# Patient Record
Sex: FEMALE | Race: WHITE | NOT HISPANIC OR LATINO
[De-identification: names, ages, dates, MRNs, and addresses within clinical notes are randomized per-mention and may not be internally consistent; named-entity substitution may affect disease eponyms.]

---

## 2018-04-02 ENCOUNTER — RESULT REVIEW (OUTPATIENT)
Age: 78
End: 2018-04-02

## 2018-10-10 ENCOUNTER — RESULT REVIEW (OUTPATIENT)
Age: 78
End: 2018-10-10

## 2018-10-29 PROBLEM — Z00.00 ENCOUNTER FOR PREVENTIVE HEALTH EXAMINATION: Status: ACTIVE | Noted: 2018-10-29

## 2018-12-11 ENCOUNTER — RECORD ABSTRACTING (OUTPATIENT)
Age: 78
End: 2018-12-11

## 2018-12-11 DIAGNOSIS — Z83.3 FAMILY HISTORY OF DIABETES MELLITUS: ICD-10-CM

## 2018-12-11 DIAGNOSIS — Z87.891 PERSONAL HISTORY OF NICOTINE DEPENDENCE: ICD-10-CM

## 2018-12-11 DIAGNOSIS — Z82.49 FAMILY HISTORY OF ISCHEMIC HEART DISEASE AND OTHER DISEASES OF THE CIRCULATORY SYSTEM: ICD-10-CM

## 2018-12-11 DIAGNOSIS — M41.26 OTHER IDIOPATHIC SCOLIOSIS, LUMBAR REGION: ICD-10-CM

## 2018-12-11 RX ORDER — DIAPER,BRIEF,INFANT-TODD,DISP
EACH MISCELLANEOUS
Refills: 0 | Status: ACTIVE | COMMUNITY

## 2018-12-17 ENCOUNTER — APPOINTMENT (OUTPATIENT)
Dept: RHEUMATOLOGY | Facility: CLINIC | Age: 78
End: 2018-12-17

## 2019-01-11 ENCOUNTER — RX RENEWAL (OUTPATIENT)
Age: 79
End: 2019-01-11

## 2019-02-06 ENCOUNTER — RX RENEWAL (OUTPATIENT)
Age: 79
End: 2019-02-06

## 2019-03-01 ENCOUNTER — APPOINTMENT (OUTPATIENT)
Dept: RHEUMATOLOGY | Facility: CLINIC | Age: 79
End: 2019-03-01
Payer: MEDICARE

## 2019-03-01 VITALS
OXYGEN SATURATION: 96 % | DIASTOLIC BLOOD PRESSURE: 58 MMHG | BODY MASS INDEX: 25.3 KG/M2 | WEIGHT: 141 LBS | HEART RATE: 69 BPM | HEIGHT: 62.5 IN | SYSTOLIC BLOOD PRESSURE: 124 MMHG

## 2019-03-01 DIAGNOSIS — M17.0 BILATERAL PRIMARY OSTEOARTHRITIS OF KNEE: ICD-10-CM

## 2019-03-01 PROCEDURE — 99213 OFFICE O/P EST LOW 20 MIN: CPT

## 2019-03-01 RX ORDER — GLUCOSAMINE HCL 500 MG
500 TABLET ORAL
Refills: 0 | Status: DISCONTINUED | COMMUNITY
End: 2019-03-01

## 2019-03-01 NOTE — HISTORY OF PRESENT ILLNESS
[FreeTextEntry1] : Patient reports that Medrol Dose Pack, then continued 2 mg daily, Rx'ed in October, was very effective.  Taking medications as Rx'ed.

## 2019-03-01 NOTE — PHYSICAL EXAM
[General Appearance - Alert] : alert [General Appearance - In No Acute Distress] : in no acute distress [General Appearance - Well Nourished] : well nourished [General Appearance - Well Developed] : well developed [Sclera] : the sclera and conjunctiva were normal [] : no rash [FreeTextEntry1] : There are prominent Heberden's and Boudhanrd's nodes with deformities and without active, acute synovitis.

## 2019-03-01 NOTE — REVIEW OF SYSTEMS
[Abdominal Pain] : abdominal pain [Diarrhea] : diarrhea [Joint Pain] : joint pain [Joint Swelling] : joint swelling [Joint Stiffness] : joint stiffness [Fever] : no fever [Chills] : no chills [Eye Pain] : no eye pain [Red Eyes] : eyes not red [Shortness Of Breath] : no shortness of breath [Cough] : no cough [Skin Lesions] : no skin lesions

## 2019-04-03 ENCOUNTER — APPOINTMENT (OUTPATIENT)
Dept: GASTROENTEROLOGY | Facility: CLINIC | Age: 79
End: 2019-04-03
Payer: MEDICARE

## 2019-04-03 VITALS
WEIGHT: 140 LBS | BODY MASS INDEX: 25.12 KG/M2 | DIASTOLIC BLOOD PRESSURE: 76 MMHG | HEIGHT: 62.5 IN | SYSTOLIC BLOOD PRESSURE: 130 MMHG | HEART RATE: 72 BPM

## 2019-04-03 DIAGNOSIS — Z80.0 FAMILY HISTORY OF MALIGNANT NEOPLASM OF DIGESTIVE ORGANS: ICD-10-CM

## 2019-04-03 PROCEDURE — 99203 OFFICE O/P NEW LOW 30 MIN: CPT

## 2019-04-03 RX ORDER — RIFAXIMIN 550 MG/1
550 TABLET ORAL
Qty: 42 | Refills: 0 | Status: DISCONTINUED | COMMUNITY
Start: 2018-11-02 | End: 2019-04-03

## 2019-04-03 NOTE — PHYSICAL EXAM
[General Appearance - Alert] : alert [General Appearance - In No Acute Distress] : in no acute distress [Sclera] : the sclera and conjunctiva were normal [Outer Ear] : the ears and nose were normal in appearance [Neck Appearance] : the appearance of the neck was normal [] : no respiratory distress [Abdomen Soft] : soft [FreeTextEntry1] : deferred [Skin Color & Pigmentation] : normal skin color and pigmentation [No Focal Deficits] : no focal deficits [Oriented To Time, Place, And Person] : oriented to person, place, and time

## 2019-04-03 NOTE — CONSULT LETTER
[Dear  ___] : Dear  [unfilled], [Consult Letter:] : I had the pleasure of evaluating your patient, [unfilled]. [Please see my note below.] : Please see my note below. [Consult Closing:] : Thank you very much for allowing me to participate in the care of this patient.  If you have any questions, please do not hesitate to contact me. [Sincerely,] : Sincerely, [FreeTextEntry3] : Jefferson Lacey MD\par tel: [phone]\par fax: [phone]\par

## 2019-04-03 NOTE — ASSESSMENT
[FreeTextEntry1] : 1. Family  history of colon cancer:   Colonoscopy in 3/2020\par \par 2. IBS:  Food / stool diarrhea

## 2019-04-03 NOTE — HISTORY OF PRESENT ILLNESS
[de-identified] : [facility]  is being evaluated at the request of Dr. Nelson / Adrian for an opinion re: family history of colon cancer and IBS. Patient has a family h/o colon cancer in both a younger brother and sister. Has been  followed by Dr. Pitts. Last EGD  / colonoscopy was 3/2018  revealing  diverticulosis. One random colon biopsies for ? IBS -D was normal. Treated in past with Xifaxan for ? SIBO. Colon in 2013 / 2016  ( [facility]) unremarkable. EGD in 2015 was normal. Denies nausea, vomiting, fever, chills, reflux, BRBPR. Has frequent intermittent diarrhea in response to certain foods\par

## 2019-05-31 ENCOUNTER — RX RENEWAL (OUTPATIENT)
Age: 79
End: 2019-05-31

## 2019-06-11 ENCOUNTER — RX RENEWAL (OUTPATIENT)
Age: 79
End: 2019-06-11

## 2019-08-05 ENCOUNTER — RX RENEWAL (OUTPATIENT)
Age: 79
End: 2019-08-05

## 2019-08-07 ENCOUNTER — RX RENEWAL (OUTPATIENT)
Age: 79
End: 2019-08-07

## 2019-10-16 ENCOUNTER — RESULT REVIEW (OUTPATIENT)
Age: 79
End: 2019-10-16

## 2019-11-06 ENCOUNTER — APPOINTMENT (OUTPATIENT)
Dept: GASTROENTEROLOGY | Facility: CLINIC | Age: 79
End: 2019-11-06
Payer: MEDICARE

## 2019-11-06 VITALS
DIASTOLIC BLOOD PRESSURE: 66 MMHG | WEIGHT: 143 LBS | BODY MASS INDEX: 25.66 KG/M2 | HEART RATE: 70 BPM | HEIGHT: 62.5 IN | SYSTOLIC BLOOD PRESSURE: 120 MMHG

## 2019-11-06 PROCEDURE — 36415 COLL VENOUS BLD VENIPUNCTURE: CPT

## 2019-11-06 PROCEDURE — 99214 OFFICE O/P EST MOD 30 MIN: CPT | Mod: 25

## 2019-11-06 NOTE — HISTORY OF PRESENT ILLNESS
[de-identified] : Presents for f/u with a c/o diarrhea. C/O diarrhea most mornings. Admits to 2 exposive diarrheal episodes in the past 2 years ( not able to make it to bathroom for one of those episodes). Did not keep a food diarrhea. Labs from Dr. Campbell ( 8/2019)  were reviewed and were notable for  normal TSH/ LFTS/ HGB/ elevated MCV.  Patient has a family h/o colon cancer in both a younger brother and sister. Has been  followed by Dr. Pitts. Last EGD  / colonoscopy was 3/2018  revealing  diverticulosis. One random colon biopsies for ? IBS -D was normal. Treated in past with Xifaxan for ? SIBO. Colon in 2013 / 2016  ( [facility]) unremarkable. EGD in 2015 was normal. Denies nausea, vomiting, fever, chills, reflux, BRBPR. Has frequent intermittent diarrhea in response to certain foods\par

## 2019-11-06 NOTE — ASSESSMENT
[FreeTextEntry1] : 1. Family  history of colon cancer:   Colonoscopy in 3/2020\par \par 2. IBS:  Food / stool diary. Stool studies/labs ...if negative...trial Xifaxan

## 2019-11-06 NOTE — PHYSICAL EXAM
[General Appearance - Alert] : alert [General Appearance - In No Acute Distress] : in no acute distress [Sclera] : the sclera and conjunctiva were normal [Outer Ear] : the ears and nose were normal in appearance [Neck Appearance] : the appearance of the neck was normal [] : no respiratory distress [Abdomen Soft] : soft [Skin Color & Pigmentation] : normal skin color and pigmentation [No Focal Deficits] : no focal deficits [Oriented To Time, Place, And Person] : oriented to person, place, and time [FreeTextEntry1] : deferred

## 2019-11-06 NOTE — CONSULT LETTER
[Dear  ___] : Dear  [unfilled], [Consult Letter:] : I had the pleasure of evaluating your patient, [unfilled]. [Please see my note below.] : Please see my note below. [Consult Closing:] : Thank you very much for allowing me to participate in the care of this patient.  If you have any questions, please do not hesitate to contact me. [Sincerely,] : Sincerely, [FreeTextEntry3] : Jefferson Lacey MD\par tel: [phone]\par fax: [phone]\par

## 2019-11-07 LAB
GLIADIN IGA SER QL: <5 UNITS
GLIADIN IGG SER QL: <5 UNITS
GLIADIN PEPTIDE IGA SER-ACNC: NEGATIVE
GLIADIN PEPTIDE IGG SER-ACNC: NEGATIVE
IGA SER QL IEP: 197 MG/DL
TTG IGA SER IA-ACNC: <1.2 U/ML
TTG IGA SER-ACNC: NEGATIVE
TTG IGG SER IA-ACNC: <1.2 U/ML
TTG IGG SER IA-ACNC: NEGATIVE

## 2019-11-11 LAB
BACTERIA STL CULT: NORMAL
C DIFF TOX GENS STL QL NAA+PROBE: NORMAL
CDIFF BY PCR: NOT DETECTED

## 2019-11-12 LAB — CALPROTECTIN FECAL: <16 UG/G

## 2019-11-13 LAB
LACTOFERRIN STL-MCNC: <1
PANCREATIC ELASTASE, FECAL: 201

## 2019-11-18 ENCOUNTER — RX CHANGE (OUTPATIENT)
Age: 79
End: 2019-11-18

## 2019-11-20 LAB — DEPRECATED O AND P PREP STL: NORMAL

## 2019-11-25 ENCOUNTER — RESULT REVIEW (OUTPATIENT)
Age: 79
End: 2019-11-25

## 2019-11-26 ENCOUNTER — APPOINTMENT (OUTPATIENT)
Dept: GASTROENTEROLOGY | Facility: HOSPITAL | Age: 79
End: 2019-11-26

## 2019-12-18 ENCOUNTER — RX RENEWAL (OUTPATIENT)
Age: 79
End: 2019-12-18

## 2020-02-06 ENCOUNTER — RX RENEWAL (OUTPATIENT)
Age: 80
End: 2020-02-06

## 2020-03-06 ENCOUNTER — APPOINTMENT (OUTPATIENT)
Dept: RHEUMATOLOGY | Facility: CLINIC | Age: 80
End: 2020-03-06
Payer: MEDICARE

## 2020-03-06 VITALS
HEIGHT: 62.5 IN | DIASTOLIC BLOOD PRESSURE: 70 MMHG | BODY MASS INDEX: 26.19 KG/M2 | SYSTOLIC BLOOD PRESSURE: 100 MMHG | WEIGHT: 146 LBS

## 2020-03-06 PROCEDURE — 99214 OFFICE O/P EST MOD 30 MIN: CPT

## 2020-03-06 NOTE — HISTORY OF PRESENT ILLNESS
[FreeTextEntry1] : Patient is doing well on her current regimen of medications - she is still on low dose Medrol to control symptoms of lumbar SS, and she is taking daily quinine sulfate.  There have been no SEs from the current treatment regimen.

## 2020-03-06 NOTE — PHYSICAL EXAM
[General Appearance - Alert] : alert [General Appearance - In No Acute Distress] : in no acute distress [General Appearance - Well Nourished] : well nourished [General Appearance - Well Developed] : well developed [Sclera] : the sclera and conjunctiva were normal [Heart Rate And Rhythm] : heart rate was normal and rhythm regular [Heart Sounds] : normal S1 and S2 [] : no rash [FreeTextEntry1] : There are prominent Heberden's and Boudhanrd's nodes with deformities and without active, acute synovitis.

## 2020-05-31 ENCOUNTER — RESULT REVIEW (OUTPATIENT)
Age: 80
End: 2020-05-31

## 2020-06-01 ENCOUNTER — RESULT REVIEW (OUTPATIENT)
Age: 80
End: 2020-06-01

## 2020-06-02 ENCOUNTER — APPOINTMENT (OUTPATIENT)
Dept: GASTROENTEROLOGY | Facility: HOSPITAL | Age: 80
End: 2020-06-02

## 2020-06-16 ENCOUNTER — TRANSCRIPTION ENCOUNTER (OUTPATIENT)
Age: 80
End: 2020-06-16

## 2020-12-07 ENCOUNTER — NON-APPOINTMENT (OUTPATIENT)
Age: 80
End: 2020-12-07

## 2021-01-13 ENCOUNTER — RESULT REVIEW (OUTPATIENT)
Age: 81
End: 2021-01-13

## 2021-02-12 ENCOUNTER — APPOINTMENT (OUTPATIENT)
Dept: RHEUMATOLOGY | Facility: CLINIC | Age: 81
End: 2021-02-12
Payer: MEDICARE

## 2021-02-12 VITALS
SYSTOLIC BLOOD PRESSURE: 130 MMHG | HEIGHT: 62.5 IN | BODY MASS INDEX: 26.02 KG/M2 | WEIGHT: 145 LBS | TEMPERATURE: 98.1 F | DIASTOLIC BLOOD PRESSURE: 66 MMHG

## 2021-02-12 DIAGNOSIS — M79.89 OTHER SPECIFIED SOFT TISSUE DISORDERS: ICD-10-CM

## 2021-02-12 PROCEDURE — 99214 OFFICE O/P EST MOD 30 MIN: CPT

## 2021-02-12 RX ORDER — QUININE SULFATE 324 MG/1
324 CAPSULE ORAL
Qty: 200 | Refills: 0 | Status: DISCONTINUED | OUTPATIENT
End: 2021-02-12

## 2021-02-12 NOTE — HISTORY OF PRESENT ILLNESS
[FreeTextEntry1] : Patient reports that in early January she was seen in ED for palpitations. She is now having cardiac monitoring. Still taking quinine for leg cramps. Also noticed a "lump in the lateral aspect of the left thigh, initially noted about 4 to 5 months ago, and now seems to be getting larger. There is tenderness on palpation, and she was told that it may be a lipoma.

## 2021-02-12 NOTE — PHYSICAL EXAM
[General Appearance - Alert] : alert [General Appearance - In No Acute Distress] : in no acute distress [General Appearance - Well Nourished] : well nourished [General Appearance - Well Developed] : well developed [Sclera] : the sclera and conjunctiva were normal [Heart Rate And Rhythm] : heart rate was normal and rhythm regular [Heart Sounds] : normal S1 and S2 [] : no rash [FreeTextEntry1] : There are prominent Heberden's and Vicente's nodes with deformities and without active, acute synovitis.  There is a small, palpable mass (fullness) over the lateral aspect of the left thigh which is tender measuring about 4 by 5 cm

## 2021-02-18 ENCOUNTER — RESULT REVIEW (OUTPATIENT)
Age: 81
End: 2021-02-18

## 2021-08-09 ENCOUNTER — RX RENEWAL (OUTPATIENT)
Age: 81
End: 2021-08-09

## 2021-11-04 ENCOUNTER — APPOINTMENT (OUTPATIENT)
Dept: GASTROENTEROLOGY | Facility: CLINIC | Age: 81
End: 2021-11-04
Payer: MEDICARE

## 2021-11-04 VITALS
WEIGHT: 143 LBS | BODY MASS INDEX: 25.66 KG/M2 | HEART RATE: 67 BPM | TEMPERATURE: 96.5 F | DIASTOLIC BLOOD PRESSURE: 60 MMHG | SYSTOLIC BLOOD PRESSURE: 110 MMHG | OXYGEN SATURATION: 97 % | HEIGHT: 62.5 IN

## 2021-11-04 DIAGNOSIS — Z80.0 ENCOUNTER FOR SCREENING FOR MALIGNANT NEOPLASM OF COLON: ICD-10-CM

## 2021-11-04 DIAGNOSIS — Z12.11 ENCOUNTER FOR SCREENING FOR MALIGNANT NEOPLASM OF COLON: ICD-10-CM

## 2021-11-04 PROCEDURE — 99213 OFFICE O/P EST LOW 20 MIN: CPT

## 2021-11-04 NOTE — ASSESSMENT
[FreeTextEntry1] : 1. Family  history of colon cancer:   Colonoscopy in 3/2020\par \par 2. IBS / diarrhea:  colonoscopy with random biopsies\par \par 3. GERD:  TUMS for now.  EGD scheduled\par \par Risks of the procedures including but not limited to bleeding / perforation / infection / anesthesia complication / missed  lesions explained to the  patient . The patient expressed understanding and a desire to proceed with the procedures.\par \par Risk of not doing procedures includes but is not limited to missed or delayed diagnosis of gastric pathology, colon cancer or other gastrointestinal pathology\par \par

## 2021-11-04 NOTE — HISTORY OF PRESENT ILLNESS
[de-identified] :  Presents  with recurrance  of  frequent morning diarrhea. Sxs are now  associated with bloating and  reflux. \par Last evaluated in 2019 when presented  for f/u with a c/o diarrhea. C/O diarrhea most mornings. Admitted to 2 explosive diarrheal episodes in the prior  2 years ( not able to make it to bathroom for one of those episodes). Did not keep a food diarrhea. Labs from Dr. Campbell ( 8/2019)  were reviewed and were notable for  normal TSH/ LFTS/ HGB/ elevated MCV. \par \par Patient has a family h/o colon cancer in both a younger brother and sister. Had been  followed by Dr. Pitts. Last EGD  / colonoscopy was 3/2018  revealing  diverticulosis. One random colon biopsies for ? IBS -D was normal. Treated in past with Xifaxan for ? SIBO. Colon in 2013 / 2016  ( [facility]) unremarkable. EGD in 2015 was normal. Denies nausea, vomiting, fever, chills, reflux, BRBPR. Has frequent intermittent diarrhea in response to certain foods\par

## 2021-11-27 ENCOUNTER — RESULT REVIEW (OUTPATIENT)
Age: 81
End: 2021-11-27

## 2021-11-29 ENCOUNTER — RESULT REVIEW (OUTPATIENT)
Age: 81
End: 2021-11-29

## 2021-11-30 ENCOUNTER — APPOINTMENT (OUTPATIENT)
Dept: GASTROENTEROLOGY | Facility: HOSPITAL | Age: 81
End: 2021-11-30

## 2021-12-09 LAB
C DIFF TOX GENS STL QL NAA+PROBE: NORMAL
CDIFF BY PCR: NOT DETECTED
G LAMBLIA AG STL QL: NORMAL

## 2021-12-17 ENCOUNTER — NON-APPOINTMENT (OUTPATIENT)
Age: 81
End: 2021-12-17

## 2021-12-17 LAB
BACTERIA STL CULT: NORMAL
CALPROTECTIN FECAL: 91 UG/G
DEPRECATED O AND P PREP STL: NORMAL
E HISTOLYT AG STL IA-ACNC: NOT DETECTED
LACTOFERRIN STL-MCNC: <1
PANCREATIC ELASTASE, FECAL: 156

## 2022-02-14 ENCOUNTER — RESULT REVIEW (OUTPATIENT)
Age: 82
End: 2022-02-14

## 2022-02-23 ENCOUNTER — RX RENEWAL (OUTPATIENT)
Age: 82
End: 2022-02-23

## 2022-03-09 ENCOUNTER — APPOINTMENT (OUTPATIENT)
Dept: RHEUMATOLOGY | Facility: CLINIC | Age: 82
End: 2022-03-09

## 2022-04-01 ENCOUNTER — APPOINTMENT (OUTPATIENT)
Dept: RHEUMATOLOGY | Facility: CLINIC | Age: 82
End: 2022-04-01
Payer: MEDICARE

## 2022-04-01 VITALS
HEART RATE: 87 BPM | TEMPERATURE: 97.1 F | WEIGHT: 144 LBS | BODY MASS INDEX: 25.84 KG/M2 | DIASTOLIC BLOOD PRESSURE: 58 MMHG | OXYGEN SATURATION: 99 % | HEIGHT: 62.5 IN | SYSTOLIC BLOOD PRESSURE: 110 MMHG

## 2022-04-01 DIAGNOSIS — E78.1 PURE HYPERGLYCERIDEMIA: ICD-10-CM

## 2022-04-01 PROCEDURE — 20610 DRAIN/INJ JOINT/BURSA W/O US: CPT

## 2022-04-01 PROCEDURE — 99214 OFFICE O/P EST MOD 30 MIN: CPT | Mod: 25

## 2022-04-01 RX ORDER — TRIAMCINOLONE ACETONIDE 40 MG/ML
40 SUSPENSION INTRA-ARTERIAL; INTRAMUSCULAR
Qty: 1 | Refills: 0 | Status: COMPLETED | OUTPATIENT
Start: 2022-04-01 | End: 2022-04-02

## 2022-04-01 RX ORDER — TRIAMCINOLONE ACETONIDE 40 MG/ML
40 SUSPENSION INTRA-ARTERIAL; INTRAMUSCULAR
Qty: 1 | Refills: 0 | Status: COMPLETED
Start: 2022-04-01 | End: 2022-04-02

## 2022-04-01 RX ADMIN — TRIAMCINOLONE ACETONIDE 0 MG/ML: 40 INJECTION, SUSPENSION INTRA-ARTICULAR; INTRAMUSCULAR at 00:00

## 2022-04-02 NOTE — PROCEDURE
[FreeTextEntry1] : Right trochanteric bursa area prepped with betadine; 1% lido applied to subQ tissue; 2cc 1% lido and 2cc Kenalog 40 (80 mg) injected into the area of the right trochanteric bursa with good results and now complications.

## 2022-04-02 NOTE — PHYSICAL EXAM
[General Appearance - Alert] : alert [General Appearance - In No Acute Distress] : in no acute distress [General Appearance - Well Nourished] : well nourished [General Appearance - Well Developed] : well developed [Sclera] : the sclera and conjunctiva were normal [] : no rash [Motor Exam] : the motor exam was normal [FreeTextEntry1] : There are prominent Heberden's and Vicente's nodes with deformities and without active, acute synovitis.  There is tenderness over the right trochanteric bursa area - this reproduces patient's CC. There is no pain on ROM of the right hip.

## 2022-04-02 NOTE — HISTORY OF PRESENT ILLNESS
[FreeTextEntry1] : Patient reports that she resumed her quinine after cardiology (electrophysiologist at [facility]) determined that the quinine was not causing her palpitations (A fib).  She is still taking other meds as Rx'ed - no GI symptoms reported except continued diarrhea (for which she has been seen by GI and worked up with upper and lower endoscopies and other testing per patient).  Reports she is having recurrence of right trochanteric bursitis symptoms, and is requesting another steroid injection - last injection was administered over three years ago.

## 2022-04-25 ENCOUNTER — APPOINTMENT (OUTPATIENT)
Dept: GASTROENTEROLOGY | Facility: CLINIC | Age: 82
End: 2022-04-25
Payer: MEDICARE

## 2022-04-25 VITALS
BODY MASS INDEX: 25.12 KG/M2 | HEIGHT: 62.5 IN | OXYGEN SATURATION: 98 % | TEMPERATURE: 98 F | HEART RATE: 55 BPM | SYSTOLIC BLOOD PRESSURE: 120 MMHG | WEIGHT: 140 LBS | DIASTOLIC BLOOD PRESSURE: 78 MMHG

## 2022-04-25 PROCEDURE — 99214 OFFICE O/P EST MOD 30 MIN: CPT

## 2022-04-25 NOTE — HISTORY OF PRESENT ILLNESS
[de-identified] : Presents  to discuss her long standing diarrhea.  Elastase low  in 12/2021, but  did not want  to take enzymes.  Additionally, does not  want  to stop Magnessium which she  takes for leg cramps.\par \par When takes 1  Imodium, does not have  a bowel movement  for 2-3  days. \par \par Stool ( 12/2021) were negative for Giardia / c. diff / ova  and parasites / culture / calprotectin. Elastase was decreased  ( 156) .  Discussed with patient who wanted to hold off on pancreatic  enzymes\par \par - EGD / colonoscopy 11/2021 was notable  for small HH / gastritis / hemorrhoids / diverticulosis / normal random colon biopsies\par \par  -EGD 11/2019 foir reflux was notable  for a small HH / gastritis \par \par \par At last office  evaluation in 2019  c/o diarrhea. C/O diarrhea most mornings. Admitted to 2 explosive diarrheal episodes in the past  ( not able to make it to bathroom for one of those episodes). Did not keep a food diarrhea. \par \par Labs from Dr. Campbell ( 8/2019)  were reviewed and were notable for  normal TSH/ LFTS/ HGB/ elevated MCV.  \par \par Patient has a family h/o colon cancer in both a younger brother and sister. Has been  followed by Dr. Pitts. \par \par -EGD  / colonoscopy ([facility])  3/2018  revealed  diverticulosis. One random colon biopsies for ? IBS -D was normal. Treated in past with Xifaxan for ? SIBO. \par -Colon in 2013 / 2016  ( [facility]) unremarkable. EGD in 2015 was normal. \par \par Denies nausea, vomiting, fever, chills, reflux, BRBPR. Has frequent intermittent diarrhea in response to certain foods\par

## 2022-04-25 NOTE — ASSESSMENT
[FreeTextEntry1] : 1. Family  history of colon cancer:   Last colonoscopy in 11/2021 was unremarkable\par \par 2. IBS / ? pancreatic  insufficiency:  Stool studies notable  for slightly decreased Elastase. Reluctant  to try pancreatic  enzymes  or  stop Magnesium.  Recommended 1  Imodium every 3  days which has controlled her diarrhea in the past\par \par follow up as needed\par Pertinent available records reviewed\par

## 2022-06-01 ENCOUNTER — APPOINTMENT (OUTPATIENT)
Dept: RHEUMATOLOGY | Facility: CLINIC | Age: 82
End: 2022-06-01
Payer: MEDICARE

## 2022-06-01 VITALS
DIASTOLIC BLOOD PRESSURE: 60 MMHG | WEIGHT: 140 LBS | HEIGHT: 62.5 IN | SYSTOLIC BLOOD PRESSURE: 126 MMHG | BODY MASS INDEX: 25.12 KG/M2

## 2022-06-01 PROCEDURE — 20610 DRAIN/INJ JOINT/BURSA W/O US: CPT

## 2022-06-01 PROCEDURE — 99212 OFFICE O/P EST SF 10 MIN: CPT | Mod: 25

## 2022-06-01 RX ORDER — TRIAMCINOLONE ACETONIDE 40 MG/ML
40 SUSPENSION INTRA-ARTERIAL; INTRAMUSCULAR
Qty: 1 | Refills: 0 | Status: COMPLETED
Start: 2022-06-01 | End: 2022-06-02

## 2022-06-01 RX ORDER — TRIAMCINOLONE ACETONIDE 40 MG/ML
40 SUSPENSION INTRA-ARTERIAL; INTRAMUSCULAR
Qty: 1 | Refills: 0 | Status: COMPLETED | OUTPATIENT
Start: 2022-06-01 | End: 2022-06-02

## 2022-06-01 RX ADMIN — TRIAMCINOLONE ACETONIDE 0 MG/ML: 40 INJECTION, SUSPENSION INTRA-ARTICULAR; INTRAMUSCULAR at 00:00

## 2022-06-01 NOTE — REVIEW OF SYSTEMS
OB [Abdominal Pain] : abdominal pain [Diarrhea] : diarrhea [Joint Pain] : joint pain [Joint Swelling] : joint swelling [Joint Stiffness] : joint stiffness [Fever] : no fever [Chills] : no chills [Eye Pain] : no eye pain [Red Eyes] : eyes not red [Shortness Of Breath] : no shortness of breath [Cough] : no cough [Skin Lesions] : no skin lesions

## 2022-06-01 NOTE — HISTORY OF PRESENT ILLNESS
[FreeTextEntry1] : Patient is requesting a repeat steroid injection into the right trochanteric bursa area. Was on a trip to [place] and walked a lot, developing recurrent symptoms of bursitis in the right hip. Requesting steroid injection.

## 2022-06-27 ENCOUNTER — RESULT REVIEW (OUTPATIENT)
Age: 82
End: 2022-06-27

## 2022-06-29 DIAGNOSIS — S76.011D STRAIN OF MUSCLE, FASCIA AND TENDON OF RIGHT HIP, SUBSEQUENT ENCOUNTER: ICD-10-CM

## 2022-11-08 ENCOUNTER — APPOINTMENT (OUTPATIENT)
Dept: RHEUMATOLOGY | Facility: CLINIC | Age: 82
End: 2022-11-08

## 2022-11-08 VITALS
DIASTOLIC BLOOD PRESSURE: 76 MMHG | HEIGHT: 62.5 IN | BODY MASS INDEX: 25.12 KG/M2 | HEART RATE: 50 BPM | SYSTOLIC BLOOD PRESSURE: 130 MMHG | WEIGHT: 140 LBS | OXYGEN SATURATION: 97 %

## 2022-11-08 DIAGNOSIS — R22.32 LOCALIZED SWELLING, MASS AND LUMP, LEFT UPPER LIMB: ICD-10-CM

## 2022-11-08 PROCEDURE — 99214 OFFICE O/P EST MOD 30 MIN: CPT

## 2022-11-08 RX ORDER — APIXABAN 5 MG/1
5 TABLET, FILM COATED ORAL
Qty: 60 | Refills: 0 | Status: ACTIVE | COMMUNITY
Start: 2022-07-26

## 2022-11-08 RX ORDER — QUININE SULFATE 325 MG
300 CAPSULE ORAL
Refills: 0 | Status: ACTIVE | COMMUNITY

## 2022-11-08 NOTE — HISTORY OF PRESENT ILLNESS
[FreeTextEntry1] : Patient comes in earlier than scheduled because of progressive difficulty with walking - feels heaviness of the legs with instability while ambulating. Also had aspiration of a swelling of the left elbow. Swelling returned, but now is having a hardened lump over the area.

## 2022-11-30 DIAGNOSIS — R26.89 OTHER ABNORMALITIES OF GAIT AND MOBILITY: ICD-10-CM

## 2023-02-02 ENCOUNTER — OFFICE (OUTPATIENT)
Dept: URBAN - METROPOLITAN AREA CLINIC 29 | Facility: CLINIC | Age: 83
Setting detail: OPHTHALMOLOGY
End: 2023-02-02
Payer: MEDICARE

## 2023-02-02 DIAGNOSIS — H35.3111: ICD-10-CM

## 2023-02-02 DIAGNOSIS — H04.123: ICD-10-CM

## 2023-02-02 DIAGNOSIS — H43.393: ICD-10-CM

## 2023-02-02 DIAGNOSIS — H35.3122: ICD-10-CM

## 2023-02-02 DIAGNOSIS — H44.23: ICD-10-CM

## 2023-02-02 PROCEDURE — 92134 CPTRZ OPH DX IMG PST SGM RTA: CPT | Performed by: OPHTHALMOLOGY

## 2023-02-02 PROCEDURE — 92014 COMPRE OPH EXAM EST PT 1/>: CPT | Performed by: OPHTHALMOLOGY

## 2023-02-02 ASSESSMENT — REFRACTION_MANIFEST
OS_SPHERE: -1.00
OS_VA1: 20/40
OD_CYLINDER: SPHERE
OS_CYLINDER: SPHERE
OD_SPHERE: -1.00
OU_VA: 20/25
OD_VA1: 20/25-2

## 2023-02-02 ASSESSMENT — REFRACTION_AUTOREFRACTION
OS_SPHERE: -1.00
OS_CYLINDER: +0.50
OD_SPHERE: -1.50
OD_CYLINDER: +1.50
OS_AXIS: 25
OD_AXIS: 170

## 2023-02-02 ASSESSMENT — VISUAL ACUITY
OS_BCVA: 20/40-2
OD_BCVA: 20/50-2

## 2023-02-02 ASSESSMENT — CONFRONTATIONAL VISUAL FIELD TEST (CVF)
OS_FINDINGS: FULL
OD_FINDINGS: FULL

## 2023-02-02 ASSESSMENT — SPHEQUIV_DERIVED
OD_SPHEQUIV: -0.75
OS_SPHEQUIV: -0.75

## 2023-02-02 ASSESSMENT — TEAR BREAK UP TIME (TBUT)
OD_TBUT: 2+
OS_TBUT: 2+

## 2023-04-26 ENCOUNTER — RX RENEWAL (OUTPATIENT)
Age: 83
End: 2023-04-26

## 2023-06-19 ENCOUNTER — NON-APPOINTMENT (OUTPATIENT)
Age: 83
End: 2023-06-19

## 2023-06-23 LAB
CDIFF BY PCR: NOT DETECTED
DEPRECATED O AND P PREP STL: NORMAL
ENTEROPATHOGENIC E. COLI (EPEC): DETECTED
G LAMBLIA AG STL QL: NORMAL
GI PCR PANEL: DETECTED

## 2023-06-27 LAB — PANCREATIC ELASTASE, FECAL: <50 CD:794062645

## 2023-06-28 LAB — CALPROTECTIN FECAL: 137 UG/G

## 2023-07-06 LAB — LACTOFERRIN STL-MCNC: 1.8 CD:794062635

## 2023-07-07 ENCOUNTER — APPOINTMENT (OUTPATIENT)
Dept: GASTROENTEROLOGY | Facility: CLINIC | Age: 83
End: 2023-07-07

## 2023-07-13 ENCOUNTER — APPOINTMENT (OUTPATIENT)
Dept: GASTROENTEROLOGY | Facility: CLINIC | Age: 83
End: 2023-07-13
Payer: MEDICARE

## 2023-07-13 VITALS
DIASTOLIC BLOOD PRESSURE: 62 MMHG | SYSTOLIC BLOOD PRESSURE: 128 MMHG | HEIGHT: 62.5 IN | BODY MASS INDEX: 25.12 KG/M2 | WEIGHT: 140 LBS

## 2023-07-13 DIAGNOSIS — R49.9 UNSPECIFIED VOICE AND RESONANCE DISORDER: ICD-10-CM

## 2023-07-13 DIAGNOSIS — R14.0 ABDOMINAL DISTENSION (GASEOUS): ICD-10-CM

## 2023-07-13 PROCEDURE — 99215 OFFICE O/P EST HI 40 MIN: CPT

## 2023-07-13 NOTE — ASSESSMENT
[FreeTextEntry1] : 1. Epigastric pain / anemia: EGD planned. Obtain most recent labs from cardiologist that documents lower HGB\par \par 2. Raspy voice: ENT evaluation\par \par 3. Family  history of colon cancer:   Last colonoscopy in 11/2021 was unremarkable\par \par 4. IBS / ? pancreatic  insufficiency:  Stool studies decreased Elastase. Will start pancreatic enzymes. Will consider discontinuing Magnesium\par \par Pertinent available records reviewed\par Risks of the procedure including but not limited to bleeding / perforation / infection / anesthesia complication / missed polyp or lesion explained to the  patient . The patient expressed understanding and a desire to proceed with the procedure.\par \par Risk of not doing procedure includes but is not limited to missed or delayed diagnosis of gastrointestinal pathology.\par \par Pertinent available records reviewed\par

## 2023-07-13 NOTE — HISTORY OF PRESENT ILLNESS
[de-identified] : Presents with persistent intermittent diarrhea. Work up has suggested EPI...still on Magnesium / not on pancreatic enzymes.  Additionally c/o epigastric discomfort ( improved today c/w  4 days ago when she first noticed it) . Noted with decreased HGB as per cardiologist ( labs not presently available) \par Finally..c/o raspy voice.\par \par Denies nausea, vomiting, fever, chills, melena, hematemesis\par \par Treated with Azithromycin for EPEC as possible cause of increased diarrhea . Elastase remained low on 6/2023 sample. Calprotectin was mildly elevated at 137\par \par Last evaluated  in office 4/2022 to discuss her long standing diarrhea.  Elastase low  in 12/2021, but  did not want  to take enzymes.  Additionally, did not  want  to stop Magnessium which she  takes for leg cramps.\par \par When takes 1  Imodium, does not have  a bowel movement  for 2-3  days. \par \par Stool ( 12/2021) were negative for Giardia / c. diff / ova  and parasites / culture / calprotectin. Elastase was decreased  ( 156) .  Discussed with patient who wanted to hold off on pancreatic  enzymes\par \par - EGD / colonoscopy 11/2021 was notable  for small HH / gastritis / hemorrhoids / diverticulosis / normal random colon biopsies\par \par  -EGD 11/2019 foir reflux was notable  for a small HH / gastritis \par \par \par At last office  evaluation in 2019  c/o diarrhea. C/O diarrhea most mornings. Admitted to 2 explosive diarrheal episodes in the past  ( not able to make it to bathroom for one of those episodes). Did not keep a food diarrhea. \par \par Labs from Dr. Campbell ( 8/2019)  were reviewed and were notable for  normal TSH/ LFTS/ HGB/ elevated MCV.  \par \par Patient has a family h/o colon cancer in both a younger brother and sister. Has been  followed by Dr. Pitts. \par \par -EGD  / colonoscopy ([facility])  3/2018  revealed  diverticulosis. One random colon biopsies for ? IBS -D was normal. Treated in past with Xifaxan for ? SIBO. \par -Colon in 2013 / 2016  ( [facility]) unremarkable. EGD in 2015 was normal. \par \par Denies nausea, vomiting, fever, chills, reflux, BRBPR. Has frequent intermittent diarrhea in response to certain foods\par

## 2023-07-17 ENCOUNTER — RESULT REVIEW (OUTPATIENT)
Age: 83
End: 2023-07-17

## 2023-07-18 ENCOUNTER — APPOINTMENT (OUTPATIENT)
Dept: GASTROENTEROLOGY | Facility: HOSPITAL | Age: 83
End: 2023-07-18

## 2023-08-03 ENCOUNTER — RX RENEWAL (OUTPATIENT)
Age: 83
End: 2023-08-03

## 2023-08-30 ENCOUNTER — OFFICE (OUTPATIENT)
Dept: URBAN - METROPOLITAN AREA CLINIC 29 | Facility: CLINIC | Age: 83
Setting detail: OPHTHALMOLOGY
End: 2023-08-30
Payer: MEDICARE

## 2023-08-30 DIAGNOSIS — H04.123: ICD-10-CM

## 2023-08-30 DIAGNOSIS — G43.809: ICD-10-CM

## 2023-08-30 DIAGNOSIS — H35.3122: ICD-10-CM

## 2023-08-30 DIAGNOSIS — H43.393: ICD-10-CM

## 2023-08-30 DIAGNOSIS — H35.3111: ICD-10-CM

## 2023-08-30 DIAGNOSIS — H44.23: ICD-10-CM

## 2023-08-30 PROBLEM — Z96.1 PSEUDOPHAKIA ; BOTH EYES: Status: ACTIVE | Noted: 2023-08-30

## 2023-08-30 PROBLEM — H26.40 POSTERIOR CAPSULAR OPACIFICATION ; BOTH EYES: Status: ACTIVE | Noted: 2023-08-30

## 2023-08-30 PROCEDURE — 92014 COMPRE OPH EXAM EST PT 1/>: CPT | Performed by: OPHTHALMOLOGY

## 2023-08-30 PROCEDURE — 92250 FUNDUS PHOTOGRAPHY W/I&R: CPT | Performed by: OPHTHALMOLOGY

## 2023-08-30 ASSESSMENT — VISUAL ACUITY
OD_BCVA: 20/40
OS_BCVA: 20/50-2

## 2023-08-30 ASSESSMENT — REFRACTION_MANIFEST
OS_SPHERE: -1.00
OS_VA1: 20/40
OS_CYLINDER: SPHERE
OU_VA: 20/25
OD_VA1: 20/25-2
OD_SPHERE: -1.00
OD_CYLINDER: SPHERE

## 2023-08-30 ASSESSMENT — REFRACTION_AUTOREFRACTION
OS_SPHERE: -1.00
OS_AXIS: 25
OS_CYLINDER: +0.50
OD_SPHERE: -1.50
OD_CYLINDER: +1.50
OD_AXIS: 170

## 2023-08-30 ASSESSMENT — TEAR BREAK UP TIME (TBUT)
OS_TBUT: 2+
OD_TBUT: 2+

## 2023-08-30 ASSESSMENT — SPHEQUIV_DERIVED
OD_SPHEQUIV: -0.75
OS_SPHEQUIV: -0.75

## 2023-08-30 ASSESSMENT — CONFRONTATIONAL VISUAL FIELD TEST (CVF)
OS_FINDINGS: FULL
OD_FINDINGS: FULL

## 2023-09-01 DIAGNOSIS — K64.9 UNSPECIFIED HEMORRHOIDS: ICD-10-CM

## 2023-09-01 RX ORDER — HYDROCORTISONE 25 MG/G
2.5 CREAM TOPICAL TWICE DAILY
Qty: 20 | Refills: 0 | Status: ACTIVE | COMMUNITY
Start: 2023-09-01 | End: 1900-01-01

## 2023-10-23 ENCOUNTER — APPOINTMENT (OUTPATIENT)
Dept: GASTROENTEROLOGY | Facility: CLINIC | Age: 83
End: 2023-10-23
Payer: MEDICARE

## 2023-10-23 VITALS
HEART RATE: 61 BPM | SYSTOLIC BLOOD PRESSURE: 124 MMHG | BODY MASS INDEX: 27.19 KG/M2 | WEIGHT: 144 LBS | HEIGHT: 61 IN | DIASTOLIC BLOOD PRESSURE: 70 MMHG | OXYGEN SATURATION: 96 %

## 2023-10-23 DIAGNOSIS — R19.7 DIARRHEA, UNSPECIFIED: ICD-10-CM

## 2023-10-23 PROCEDURE — 99214 OFFICE O/P EST MOD 30 MIN: CPT

## 2023-11-08 ENCOUNTER — APPOINTMENT (OUTPATIENT)
Dept: RHEUMATOLOGY | Facility: CLINIC | Age: 83
End: 2023-11-08
Payer: MEDICARE

## 2023-11-08 VITALS
BODY MASS INDEX: 27.19 KG/M2 | HEART RATE: 61 BPM | HEIGHT: 61 IN | DIASTOLIC BLOOD PRESSURE: 68 MMHG | WEIGHT: 144 LBS | OXYGEN SATURATION: 95 % | SYSTOLIC BLOOD PRESSURE: 124 MMHG

## 2023-11-08 DIAGNOSIS — R25.2 CRAMP AND SPASM: ICD-10-CM

## 2023-11-08 PROCEDURE — 99214 OFFICE O/P EST MOD 30 MIN: CPT | Mod: 25

## 2023-11-08 PROCEDURE — 20610 DRAIN/INJ JOINT/BURSA W/O US: CPT | Mod: RT

## 2023-11-08 RX ORDER — TRIAMCINOLONE ACETONIDE 40 MG/ML
40 SUSPENSION INTRA-ARTERIAL; INTRAMUSCULAR
Qty: 1 | Refills: 0 | Status: COMPLETED | OUTPATIENT
Start: 2023-11-08 | End: 2023-11-09

## 2023-11-08 RX ORDER — TRIAMCINOLONE ACETONIDE 40 MG/ML
40 SUSPENSION INTRA-ARTERIAL; INTRAMUSCULAR
Qty: 1 | Refills: 0 | Status: COMPLETED
Start: 2023-11-08 | End: 2023-11-09

## 2023-11-08 RX ORDER — PANTOPRAZOLE 40 MG/1
40 TABLET, DELAYED RELEASE ORAL
Qty: 30 | Refills: 3 | Status: DISCONTINUED | COMMUNITY
Start: 2023-07-18 | End: 2023-11-08

## 2023-11-08 RX ORDER — AZITHROMYCIN 250 MG/1
250 TABLET, FILM COATED ORAL
Qty: 8 | Refills: 0 | Status: DISCONTINUED | COMMUNITY
Start: 2023-06-23 | End: 2023-11-08

## 2023-11-08 RX ADMIN — TRIAMCINOLONE ACETONIDE 0 MG/ML: 40 SUSPENSION INTRA-ARTERIAL; INTRAMUSCULAR at 00:00

## 2023-11-13 RX ORDER — METOPROLOL SUCCINATE 50 MG/1
50 TABLET, EXTENDED RELEASE ORAL
Qty: 270 | Refills: 0 | Status: ACTIVE | COMMUNITY
Start: 2023-07-21

## 2023-11-16 ENCOUNTER — OFFICE (OUTPATIENT)
Dept: URBAN - METROPOLITAN AREA CLINIC 29 | Facility: CLINIC | Age: 83
Setting detail: OPHTHALMOLOGY
End: 2023-11-16
Payer: MEDICARE

## 2023-11-16 DIAGNOSIS — G43.809: ICD-10-CM

## 2023-11-16 DIAGNOSIS — H04.123: ICD-10-CM

## 2023-11-16 DIAGNOSIS — Z96.1: ICD-10-CM

## 2023-11-16 DIAGNOSIS — H26.40: ICD-10-CM

## 2023-11-16 DIAGNOSIS — H44.23: ICD-10-CM

## 2023-11-16 DIAGNOSIS — H35.3122: ICD-10-CM

## 2023-11-16 DIAGNOSIS — H43.393: ICD-10-CM

## 2023-11-16 DIAGNOSIS — H35.3111: ICD-10-CM

## 2023-11-16 PROCEDURE — 99214 OFFICE O/P EST MOD 30 MIN: CPT | Performed by: OPHTHALMOLOGY

## 2023-11-16 ASSESSMENT — REFRACTION_AUTOREFRACTION
OD_AXIS: 170
OD_CYLINDER: +1.50
OS_AXIS: 25
OS_SPHERE: -1.00
OS_CYLINDER: +0.50
OD_SPHERE: -1.50

## 2023-11-16 ASSESSMENT — CONFRONTATIONAL VISUAL FIELD TEST (CVF)
OD_FINDINGS: FULL
OS_FINDINGS: FULL

## 2023-11-16 ASSESSMENT — REFRACTION_MANIFEST
OD_VA1: 20/25-2
OS_VA1: 20/40
OD_CYLINDER: SPHERE
OS_CYLINDER: SPHERE
OD_SPHERE: -1.00
OS_SPHERE: -1.00
OU_VA: 20/25

## 2023-11-16 ASSESSMENT — SPHEQUIV_DERIVED
OD_SPHEQUIV: -0.75
OS_SPHEQUIV: -0.75

## 2023-11-16 ASSESSMENT — TEAR BREAK UP TIME (TBUT)
OS_TBUT: 2+
OD_TBUT: 2+

## 2024-01-02 ENCOUNTER — RX ONLY (RX ONLY)
Age: 84
End: 2024-01-02

## 2024-01-02 ENCOUNTER — OFFICE (OUTPATIENT)
Dept: URBAN - METROPOLITAN AREA CLINIC 29 | Facility: CLINIC | Age: 84
Setting detail: OPHTHALMOLOGY
End: 2024-01-02
Payer: MEDICARE

## 2024-01-02 DIAGNOSIS — H26.491: ICD-10-CM

## 2024-01-02 PROCEDURE — 66821 AFTER CATARACT LASER SURGERY: CPT | Mod: RT | Performed by: OPHTHALMOLOGY

## 2024-01-02 ASSESSMENT — REFRACTION_MANIFEST
OD_VA1: 20/25-2
OU_VA: 20/25
OD_SPHERE: -1.00
OD_CYLINDER: SPHERE
OS_CYLINDER: SPHERE
OS_SPHERE: -1.00
OS_VA1: 20/40

## 2024-01-02 ASSESSMENT — SPHEQUIV_DERIVED
OD_SPHEQUIV: -0.625
OS_SPHEQUIV: -0.375

## 2024-01-02 ASSESSMENT — REFRACTION_AUTOREFRACTION
OD_AXIS: 180
OD_CYLINDER: +2.25
OS_SPHERE: -1.25
OD_SPHERE: -1.75
OS_AXIS: 020
OS_CYLINDER: +1.75

## 2024-01-02 ASSESSMENT — CONFRONTATIONAL VISUAL FIELD TEST (CVF)
OD_FINDINGS: FULL
OS_FINDINGS: FULL

## 2024-01-02 ASSESSMENT — TEAR BREAK UP TIME (TBUT)
OS_TBUT: 2+
OD_TBUT: 2+

## 2024-01-22 DIAGNOSIS — G89.29 PAIN IN RIGHT SHOULDER: ICD-10-CM

## 2024-01-22 DIAGNOSIS — M25.511 PAIN IN RIGHT SHOULDER: ICD-10-CM

## 2024-01-23 ENCOUNTER — OFFICE (OUTPATIENT)
Dept: URBAN - METROPOLITAN AREA CLINIC 122 | Facility: CLINIC | Age: 84
Setting detail: OPHTHALMOLOGY
End: 2024-01-23
Payer: MEDICARE

## 2024-01-23 DIAGNOSIS — H26.493: ICD-10-CM

## 2024-01-23 DIAGNOSIS — H16.223: ICD-10-CM

## 2024-01-23 PROCEDURE — 99024 POSTOP FOLLOW-UP VISIT: CPT | Performed by: OPHTHALMOLOGY

## 2024-01-23 ASSESSMENT — REFRACTION_MANIFEST
OD_AXIS: 85
OD_VA1: 20/25
OD_SPHERE: -0.25
OU_VA: 20/25
OS_VA1: 20/40
OS_SPHERE: -1.00
OD_CYLINDER: -2.00
OS_CYLINDER: SPHERE

## 2024-01-23 ASSESSMENT — REFRACTION_AUTOREFRACTION
OD_SPHERE: +0.25
OS_AXIS: 020
OD_CYLINDER: -2.25
OD_AXIS: 84
OS_SPHERE: -1.25
OS_CYLINDER: +1.75

## 2024-01-23 ASSESSMENT — SPHEQUIV_DERIVED
OD_SPHEQUIV: -1.25
OS_SPHEQUIV: -0.375
OD_SPHEQUIV: -0.875

## 2024-01-23 ASSESSMENT — TEAR BREAK UP TIME (TBUT)
OS_TBUT: 2+
OD_TBUT: 2+

## 2024-01-30 ENCOUNTER — RESULT REVIEW (OUTPATIENT)
Age: 84
End: 2024-01-30

## 2024-02-14 ENCOUNTER — RX RENEWAL (OUTPATIENT)
Age: 84
End: 2024-02-14

## 2024-02-14 RX ORDER — PANCRELIPASE LIPASE, PANCRELIPASE PROTEASE, PANCRELIPASE AMYLASE 20000; 63000; 84000 [USP'U]/1; [USP'U]/1; [USP'U]/1
20000-63000 CAPSULE, DELAYED RELEASE ORAL
Qty: 90 | Refills: 3 | Status: ACTIVE | COMMUNITY
Start: 2023-07-13 | End: 1900-01-01

## 2024-03-07 RX ORDER — NABUMETONE 750 MG/1
750 TABLET, FILM COATED ORAL
Qty: 180 | Refills: 3 | Status: DISCONTINUED | COMMUNITY
Start: 2019-02-06 | End: 2024-03-07

## 2024-03-13 ENCOUNTER — APPOINTMENT (OUTPATIENT)
Dept: RHEUMATOLOGY | Facility: CLINIC | Age: 84
End: 2024-03-13
Payer: MEDICARE

## 2024-03-13 VITALS
DIASTOLIC BLOOD PRESSURE: 70 MMHG | WEIGHT: 141 LBS | HEART RATE: 68 BPM | SYSTOLIC BLOOD PRESSURE: 122 MMHG | BODY MASS INDEX: 26.62 KG/M2 | OXYGEN SATURATION: 98 % | HEIGHT: 61 IN

## 2024-03-13 DIAGNOSIS — M47.816 SPONDYLOSIS W/OUT MYELOPATHY OR RADICULOPATHY, LUMBAR REGION: ICD-10-CM

## 2024-03-13 DIAGNOSIS — I48.0 PAROXYSMAL ATRIAL FIBRILLATION: ICD-10-CM

## 2024-03-13 DIAGNOSIS — M48.061 SPINAL STENOSIS, LUMBAR REGION WITHOUT NEUROGENIC CLAUDICATION: ICD-10-CM

## 2024-03-13 DIAGNOSIS — M15.9 POLYOSTEOARTHRITIS, UNSPECIFIED: ICD-10-CM

## 2024-03-13 DIAGNOSIS — N18.31 CHRONIC KIDNEY DISEASE, STAGE 3A: ICD-10-CM

## 2024-03-13 DIAGNOSIS — M19.90 UNSPECIFIED OSTEOARTHRITIS, UNSPECIFIED SITE: ICD-10-CM

## 2024-03-13 DIAGNOSIS — M47.812 SPONDYLOSIS W/OUT MYELOPATHY OR RADICULOPATHY, CERVICAL REGION: ICD-10-CM

## 2024-03-13 PROCEDURE — 99214 OFFICE O/P EST MOD 30 MIN: CPT

## 2024-03-13 RX ORDER — DILTIAZEM HYDROCHLORIDE 120 MG/1
120 CAPSULE, EXTENDED RELEASE ORAL
Qty: 90 | Refills: 0 | Status: DISCONTINUED | COMMUNITY
Start: 2022-07-26 | End: 2024-03-13

## 2024-03-13 NOTE — HISTORY OF PRESENT ILLNESS
[FreeTextEntry1] : Patient comes in to discuss other options after she was found to have progressive renal disease - was found to have normal kidney function in December. then in January was found to have Stage 3a kidney disease and was advised by her cardiologist to reduce the dose of nabumetone.  Still taking 500 mg.

## 2024-03-13 NOTE — REVIEW OF SYSTEMS
[Abdominal Pain] : abdominal pain [Diarrhea] : diarrhea [Joint Swelling] : joint swelling [Joint Pain] : joint pain [Joint Stiffness] : joint stiffness [Fever] : no fever [Chills] : no chills [Eye Pain] : no eye pain [Red Eyes] : eyes not red [Shortness Of Breath] : no shortness of breath [Cough] : no cough [Skin Lesions] : no skin lesions

## 2024-03-13 NOTE — PHYSICAL EXAM
[General Appearance - In No Acute Distress] : in no acute distress [General Appearance - Alert] : alert [Sclera] : the sclera and conjunctiva were normal [General Appearance - Well Nourished] : well nourished [General Appearance - Well Developed] : well developed [] : no rash [Motor Exam] : the motor exam was normal [FreeTextEntry1] : There are prominent Heberden's and Vicente's nodes with deformities and without active, acute synovitis.

## 2024-03-25 ENCOUNTER — OFFICE (OUTPATIENT)
Dept: URBAN - METROPOLITAN AREA CLINIC 29 | Facility: CLINIC | Age: 84
Setting detail: OPHTHALMOLOGY
End: 2024-03-25
Payer: MEDICARE

## 2024-03-25 DIAGNOSIS — H35.3122: ICD-10-CM

## 2024-03-25 DIAGNOSIS — G43.809: ICD-10-CM

## 2024-03-25 DIAGNOSIS — H44.23: ICD-10-CM

## 2024-03-25 DIAGNOSIS — H26.493: ICD-10-CM

## 2024-03-25 DIAGNOSIS — H35.3111: ICD-10-CM

## 2024-03-25 DIAGNOSIS — H16.223: ICD-10-CM

## 2024-03-25 PROCEDURE — 99213 OFFICE O/P EST LOW 20 MIN: CPT | Mod: 24 | Performed by: OPHTHALMOLOGY

## 2024-03-25 PROCEDURE — 92250 FUNDUS PHOTOGRAPHY W/I&R: CPT | Performed by: OPHTHALMOLOGY

## 2024-03-25 ASSESSMENT — REFRACTION_CURRENTRX
OS_AXIS: 20
OS_SPHERE: -1.25
OS_OVR_VA: 20/
OD_OVR_VA: 20/
OD_SPHERE: -1.50
OD_AXIS: 174
OD_CYLINDER: +1.00
OS_CYLINDER: +0.75

## 2024-03-25 ASSESSMENT — REFRACTION_MANIFEST
OD_CYLINDER: -2.00
OD_VA1: 20/25
OD_AXIS: 85
OS_VA1: 20/40
OS_CYLINDER: SPHERE
OS_SPHERE: -1.00
OU_VA: 20/25
OD_SPHERE: -0.25

## 2024-03-25 ASSESSMENT — SPHEQUIV_DERIVED: OD_SPHEQUIV: -1.25

## 2024-03-27 ENCOUNTER — APPOINTMENT (OUTPATIENT)
Dept: GASTROENTEROLOGY | Facility: CLINIC | Age: 84
End: 2024-03-27
Payer: MEDICARE

## 2024-03-27 VITALS
DIASTOLIC BLOOD PRESSURE: 59 MMHG | HEART RATE: 68 BPM | BODY MASS INDEX: 26.62 KG/M2 | SYSTOLIC BLOOD PRESSURE: 126 MMHG | HEIGHT: 61 IN | WEIGHT: 141 LBS | OXYGEN SATURATION: 98 %

## 2024-03-27 DIAGNOSIS — K58.0 IRRITABLE BOWEL SYNDROME WITH DIARRHEA: ICD-10-CM

## 2024-03-27 DIAGNOSIS — K86.81 EXOCRINE PANCREATIC INSUFFICIENCY: ICD-10-CM

## 2024-03-27 PROCEDURE — G2211 COMPLEX E/M VISIT ADD ON: CPT

## 2024-03-27 PROCEDURE — 99215 OFFICE O/P EST HI 40 MIN: CPT

## 2024-03-27 RX ORDER — FAMOTIDINE 40 MG/1
40 TABLET, FILM COATED ORAL
Qty: 30 | Refills: 6 | Status: ACTIVE | COMMUNITY
Start: 2024-03-27 | End: 1900-01-01

## 2024-03-27 NOTE — PHYSICAL EXAM
[Alert] : alert [Sclera] : the sclera and conjunctiva were normal [Normal Appearance] : the appearance of the neck was normal [No Respiratory Distress] : no respiratory distress [None] : no edema [Abdomen Soft] : soft [Abnormal Walk] : normal gait [Normal Color / Pigmentation] : normal skin color and pigmentation [No Focal Deficits] : no focal deficits [Oriented To Time, Place, And Person] : oriented to person, place, and time

## 2024-03-27 NOTE — HISTORY OF PRESENT ILLNESS
[FreeTextEntry1] : Presents c/o heartburn / hoarseness ( not taking Pantoprazole secondary to diarrhea / has already seen Rodri).  Last evaluated 10/2023 at which point diarrhea somewhat improved on Zenpep. Throat burning had also resolved  Evaluated  7/2023 when was still having persistent / intermittent diarrhea. Work up has suggested EPI...was still on Magnesium / and not not on pancreatic enzymes. Also noted with decreased HGB as per cardiologist ( labs were not presently available) Finally..c/o raspy voice. ENT evaluation recommended for raspy voice. EGD planned for decreased HGB. Magnesium stopped (recommended). Zenpep prescribed.  -EGD 7/2023: Gastritis. Pantoprazole prescribed  Treated with Azithromycin for EPEC as possible cause of increased diarrhea. Elastase remained low on 6/2023 sample. Calprotectin was mildly elevated at 137  Evaluated in office 4/2022 to discuss her long standing diarrhea. Elastase low in 12/2021, but did not want to take enzymes. Additionally, did not want to stop Magnessium which she takes for leg cramps.  - EGD / colonoscopy 11/2021 was notable for small HH / gastritis / hemorrhoids / diverticulosis / normal random colon biopsies  -EGD 11/2019 foir reflux was notable for a small HH / gastritis  Patient has a family h/o colon cancer in both a younger brother and sister. Has been followed by Dr. Pitts.  -EGD / colonoscopy ([facility]) 3/2018 revealed diverticulosis. One random colon biopsies for ? IBS -D was normal. Treated in past with Xifaxan for ? SIBO. -Colon in 2013 / 2016 ( [facility]) unremarkable. EGD in 2015 was normal.

## 2024-05-14 RX ORDER — METHYLPREDNISOLONE 4 MG/1
4 TABLET ORAL
Qty: 45 | Refills: 1 | Status: ACTIVE | COMMUNITY
Start: 2023-09-04 | End: 1900-01-01

## 2024-06-03 ENCOUNTER — APPOINTMENT (OUTPATIENT)
Dept: GASTROENTEROLOGY | Facility: CLINIC | Age: 84
End: 2024-06-03
Payer: MEDICARE

## 2024-06-03 VITALS
DIASTOLIC BLOOD PRESSURE: 58 MMHG | HEART RATE: 60 BPM | OXYGEN SATURATION: 96 % | HEIGHT: 61 IN | BODY MASS INDEX: 26.81 KG/M2 | SYSTOLIC BLOOD PRESSURE: 120 MMHG | WEIGHT: 142 LBS

## 2024-06-03 DIAGNOSIS — R42 DIZZINESS AND GIDDINESS: ICD-10-CM

## 2024-06-03 DIAGNOSIS — K21.9 GASTRO-ESOPHAGEAL REFLUX DISEASE W/OUT ESOPHAGITIS: ICD-10-CM

## 2024-06-03 DIAGNOSIS — R11.2 NAUSEA WITH VOMITING, UNSPECIFIED: ICD-10-CM

## 2024-06-03 PROCEDURE — G2211 COMPLEX E/M VISIT ADD ON: CPT

## 2024-06-03 PROCEDURE — 99214 OFFICE O/P EST MOD 30 MIN: CPT

## 2024-06-03 RX ORDER — ICOSAPENT ETHYL 1000 MG/1
1 CAPSULE ORAL 4 TIMES DAILY
Refills: 0 | Status: DISCONTINUED | COMMUNITY
End: 2024-06-03

## 2024-06-03 RX ORDER — PANTOPRAZOLE 40 MG/1
40 TABLET, DELAYED RELEASE ORAL
Qty: 30 | Refills: 3 | Status: DISCONTINUED | COMMUNITY
Start: 2023-12-18 | End: 2024-06-03

## 2024-06-03 RX ORDER — DICLOFENAC SODIUM 20 MG/G
2 SOLUTION TOPICAL
Qty: 1 | Refills: 1 | Status: DISCONTINUED | COMMUNITY
Start: 2022-05-31 | End: 2024-06-03

## 2024-06-03 RX ORDER — METOPROLOL SUCCINATE 100 MG/1
100 TABLET, EXTENDED RELEASE ORAL
Qty: 180 | Refills: 0 | Status: ACTIVE | COMMUNITY
Start: 2024-06-02

## 2024-06-03 RX ORDER — DULOXETINE HYDROCHLORIDE 20 MG/1
20 CAPSULE, DELAYED RELEASE PELLETS ORAL
Qty: 30 | Refills: 2 | Status: DISCONTINUED | COMMUNITY
Start: 2024-03-13 | End: 2024-06-03

## 2024-06-03 RX ORDER — NABUMETONE 500 MG/1
500 TABLET, FILM COATED ORAL
Qty: 60 | Refills: 0 | Status: ACTIVE | COMMUNITY
Start: 2024-05-16

## 2024-06-03 RX ORDER — PANTOPRAZOLE 40 MG/1
40 TABLET, DELAYED RELEASE ORAL
Qty: 30 | Refills: 3 | Status: ACTIVE | COMMUNITY
Start: 2024-06-03 | End: 1900-01-01

## 2024-06-03 RX ORDER — DICLOFENAC SODIUM 1% 10 MG/G
1 GEL TOPICAL
Qty: 200 | Refills: 2 | Status: DISCONTINUED | COMMUNITY
Start: 2021-08-09 | End: 2024-06-03

## 2024-06-03 RX ORDER — TRIAMCINOLONE ACETONIDE 1 MG/ML
0.1 LOTION TOPICAL
Qty: 60 | Refills: 0 | Status: DISCONTINUED | COMMUNITY
Start: 2023-09-14 | End: 2024-06-03

## 2024-06-03 NOTE — ASSESSMENT
[FreeTextEntry1] : 1. Burpimg: Possible GERD.  Resume Pantoprazole  2. Emesis with dizziness: Etiology unclear. Recommended PMD / Neurology evaluation  Pertinent available records reviewed

## 2024-06-03 NOTE — HISTORY OF PRESENT ILLNESS
[FreeTextEntry1] : Presents with 3 distinct episodes of non bloody emesis last week ( 1 episode per day) . Additionally has had dizziness in the past that precedes  emesis.  Denies HA / abdominal pain. Admits to increased burping ( has not been taking her pantoprazole).    Last evaluated 3.2024 for a  c/o heartburn / hoarseness ( was not taking Pantoprazole secondary to diarrhea / had already seen Rodri).  Evaluated 10/2023 at which point diarrhea somewhat improved on Zenpep. Throat burning had also resolved  Evaluated 7/2023 when was still having persistent / intermittent diarrhea. Work up has suggested EPI...was still on Magnesium / and not not on pancreatic enzymes. Also noted with decreased HGB as per cardiologist ( labs were not presently available) Finally..c/o raspy voice. ENT evaluation recommended for raspy voice. EGD planned for decreased HGB. Magnesium stopped (recommended). Zenpep prescribed.  -EGD 7/2023: Gastritis. Pantoprazole prescribed  Treated with Azithromycin for EPEC as possible cause of increased diarrhea. Elastase remained low on 6/2023 sample. Calprotectin was mildly elevated at 137  Evaluated in office 4/2022 to discuss her long standing diarrhea. Elastase low in 12/2021, but did not want to take enzymes. Additionally, did not want to stop Magnessium which she takes for leg cramps.  - EGD / colonoscopy 11/2021 was notable for small HH / gastritis / hemorrhoids / diverticulosis / normal random colon biopsies -EGD 11/2019 foir reflux was notable for a small HH / gastritis  Patient has a family h/o colon cancer in both a younger brother and sister. Has been followed by Dr. Pitts.  -EGD / colonoscopy ([facility]) 3/2018 revealed diverticulosis. One random colon biopsies for ? IBS -D was normal. Treated in past with Xifaxan for ? SIBO. -Colon in 2013 / 2016 ( [facility]) unremarkable. EGD in 2015 was normal.

## 2024-06-03 NOTE — CONSULT LETTER
[Dear  ___] : Dear  [unfilled], [Courtesy Letter:] : I had the pleasure of seeing your patient, [unfilled], in my office today. [Please see my note below.] : Please see my note below. [Sincerely,] : Sincerely, [FreeTextEntry3] : Jefferson Lacey MD tel: [phone] fax: [phone]

## 2024-06-03 NOTE — PHYSICAL EXAM
[Alert] : alert [Sclera] : the sclera and conjunctiva were normal [Hearing Threshold Finger Rub Not [place]] : hearing was normal [Normal Appearance] : the appearance of the neck was normal [No Respiratory Distress] : no respiratory distress [Abdomen Soft] : soft [Normal Color / Pigmentation] : normal skin color and pigmentation [Cranial Nerves Intact] : cranial nerves 2-12 were intact [Oriented To Time, Place, And Person] : oriented to person, place, and time [de-identified] : ambulates with cane

## 2024-09-30 ENCOUNTER — RX RENEWAL (OUTPATIENT)
Age: 84
End: 2024-09-30

## 2024-10-10 ENCOUNTER — RESULT REVIEW (OUTPATIENT)
Age: 84
End: 2024-10-10

## 2024-10-10 ENCOUNTER — TRANSCRIPTION ENCOUNTER (OUTPATIENT)
Age: 84
End: 2024-10-10

## 2024-11-07 ENCOUNTER — APPOINTMENT (OUTPATIENT)
Dept: RHEUMATOLOGY | Facility: CLINIC | Age: 84
End: 2024-11-07
Payer: MEDICARE

## 2024-11-07 VITALS
SYSTOLIC BLOOD PRESSURE: 130 MMHG | BODY MASS INDEX: 25.3 KG/M2 | OXYGEN SATURATION: 97 % | HEIGHT: 61 IN | DIASTOLIC BLOOD PRESSURE: 70 MMHG | HEART RATE: 64 BPM | WEIGHT: 134 LBS

## 2024-11-07 DIAGNOSIS — M19.90 UNSPECIFIED OSTEOARTHRITIS, UNSPECIFIED SITE: ICD-10-CM

## 2024-11-07 DIAGNOSIS — M15.9 POLYOSTEOARTHRITIS, UNSPECIFIED: ICD-10-CM

## 2024-11-07 DIAGNOSIS — M48.061 SPINAL STENOSIS, LUMBAR REGION WITHOUT NEUROGENIC CLAUDICATION: ICD-10-CM

## 2024-11-07 DIAGNOSIS — S76.011D STRAIN OF MUSCLE, FASCIA AND TENDON OF RIGHT HIP, SUBSEQUENT ENCOUNTER: ICD-10-CM

## 2024-11-07 DIAGNOSIS — M47.812 SPONDYLOSIS W/OUT MYELOPATHY OR RADICULOPATHY, CERVICAL REGION: ICD-10-CM

## 2024-11-07 DIAGNOSIS — N18.31 CHRONIC KIDNEY DISEASE, STAGE 3A: ICD-10-CM

## 2024-11-07 DIAGNOSIS — M47.816 SPONDYLOSIS W/OUT MYELOPATHY OR RADICULOPATHY, LUMBAR REGION: ICD-10-CM

## 2024-11-07 PROCEDURE — 99214 OFFICE O/P EST MOD 30 MIN: CPT

## 2024-11-07 RX ORDER — DULOXETINE HYDROCHLORIDE 20 MG/1
20 CAPSULE, DELAYED RELEASE PELLETS ORAL
Qty: 90 | Refills: 1 | Status: ACTIVE | COMMUNITY
Start: 2024-11-07 | End: 1900-01-01

## 2025-01-09 DIAGNOSIS — R14.0 ABDOMINAL DISTENSION (GASEOUS): ICD-10-CM

## 2025-01-14 ENCOUNTER — RESULT REVIEW (OUTPATIENT)
Age: 85
End: 2025-01-14

## 2025-01-15 ENCOUNTER — NON-APPOINTMENT (OUTPATIENT)
Age: 85
End: 2025-01-15

## 2025-01-22 ENCOUNTER — APPOINTMENT (OUTPATIENT)
Dept: GASTROENTEROLOGY | Facility: CLINIC | Age: 85
End: 2025-01-22
Payer: MEDICARE

## 2025-01-22 ENCOUNTER — LABORATORY RESULT (OUTPATIENT)
Age: 85
End: 2025-01-22

## 2025-01-22 VITALS
DIASTOLIC BLOOD PRESSURE: 60 MMHG | BODY MASS INDEX: 25.3 KG/M2 | SYSTOLIC BLOOD PRESSURE: 124 MMHG | HEIGHT: 61 IN | HEART RATE: 66 BPM | OXYGEN SATURATION: 96 % | WEIGHT: 134 LBS

## 2025-01-22 DIAGNOSIS — R93.5 ABNORMAL FINDINGS ON DIAGNOSTIC IMAGING OF OTHER ABDOMINAL REGIONS, INCLUDING RETROPERITONEUM: ICD-10-CM

## 2025-01-22 DIAGNOSIS — R93.89 ABNORMAL FINDINGS ON DIAGNOSTIC IMAGING OF OTHER SPECIFIED BODY STRUCTURES: ICD-10-CM

## 2025-01-22 DIAGNOSIS — R63.4 ABNORMAL WEIGHT LOSS: ICD-10-CM

## 2025-01-22 DIAGNOSIS — R14.0 ABDOMINAL DISTENSION (GASEOUS): ICD-10-CM

## 2025-01-22 LAB
ALBUMIN SERPL ELPH-MCNC: 3.8 G/DL
ALP BLD-CCNC: 50 U/L
ALT SERPL-CCNC: 21 U/L
ANION GAP SERPL CALC-SCNC: 11 MMOL/L
AST SERPL-CCNC: 16 U/L
BILIRUB DIRECT SERPL-MCNC: 0.1 MG/DL
BILIRUB INDIRECT SERPL-MCNC: 0.2 MG/DL
BILIRUB SERPL-MCNC: 0.3 MG/DL
BUN SERPL-MCNC: 31 MG/DL
CALCIUM SERPL-MCNC: 9.6 MG/DL
CHLORIDE SERPL-SCNC: 104 MMOL/L
CO2 SERPL-SCNC: 24 MMOL/L
CREAT SERPL-MCNC: 1.24 MG/DL
CRP SERPL-MCNC: 7 MG/L
EGFR: 43 ML/MIN/1.73M2
GLUCOSE SERPL-MCNC: 121 MG/DL
LPL SERPL-CCNC: 53 U/L
POTASSIUM SERPL-SCNC: 4 MMOL/L
PROT SERPL-MCNC: 5.6 G/DL
SODIUM SERPL-SCNC: 139 MMOL/L

## 2025-01-22 PROCEDURE — G2211 COMPLEX E/M VISIT ADD ON: CPT

## 2025-01-22 PROCEDURE — 36415 COLL VENOUS BLD VENIPUNCTURE: CPT

## 2025-01-22 PROCEDURE — 99214 OFFICE O/P EST MOD 30 MIN: CPT

## 2025-01-22 RX ORDER — PNV NO.95/FERROUS FUM/FOLIC AC 28MG-0.8MG
TABLET ORAL
Refills: 0 | Status: ACTIVE | COMMUNITY

## 2025-01-22 RX ORDER — METOPROLOL SUCCINATE 25 MG/1
25 TABLET, EXTENDED RELEASE ORAL
Refills: 0 | Status: ACTIVE | COMMUNITY

## 2025-01-22 RX ORDER — ZINC SULFATE 50(220)MG
50 CAPSULE ORAL
Refills: 0 | Status: ACTIVE | COMMUNITY

## 2025-01-22 RX ORDER — ATORVASTATIN CALCIUM 10 MG/1
10 TABLET, FILM COATED ORAL
Refills: 0 | Status: ACTIVE | COMMUNITY

## 2025-01-22 RX ORDER — MULTIVIT-MIN/IRON/FOLIC ACID/K 18-600-40
CAPSULE ORAL
Refills: 0 | Status: ACTIVE | COMMUNITY

## 2025-01-22 RX ORDER — ZOLEDRONIC ACID 4 MG
4 KIT INTRAVENOUS
Refills: 0 | Status: ACTIVE | COMMUNITY

## 2025-01-22 RX ORDER — DULOXETINE HYDROCHLORIDE 20 MG/1
20 CAPSULE, DELAYED RELEASE ORAL
Refills: 0 | Status: ACTIVE | COMMUNITY

## 2025-01-23 DIAGNOSIS — R42 DIZZINESS AND GIDDINESS: ICD-10-CM

## 2025-01-24 LAB
BASOPHILS # BLD AUTO: 0.05 K/UL
BASOPHILS NFR BLD AUTO: 0.6 %
ENDOMYSIUM IGA SER QL: NEGATIVE
ENDOMYSIUM IGA TITR SER: NORMAL
EOSINOPHIL # BLD AUTO: 0.09 K/UL
EOSINOPHIL NFR BLD AUTO: 1.1 %
FOLATE SERPL-MCNC: 6.7 NG/ML
GLIADIN IGA SER QL: 1 U/ML
GLIADIN IGG SER QL: <0.4 U/ML
GLIADIN PEPTIDE IGA SER-ACNC: NEGATIVE
GLIADIN PEPTIDE IGG SER-ACNC: NEGATIVE
HCT VFR BLD CALC: 34.9 %
HGB BLD-MCNC: 11.4 G/DL
IGA SER QL IEP: 141 MG/DL
IMM GRANULOCYTES NFR BLD AUTO: 0.7 %
LYMPHOCYTES # BLD AUTO: 0.85 K/UL
LYMPHOCYTES NFR BLD AUTO: 10.2 %
MAN DIFF?: NORMAL
MCHC RBC-ENTMCNC: 32.7 G/DL
MCHC RBC-ENTMCNC: 34.9 PG
MCV RBC AUTO: 106.7 FL
MONOCYTES # BLD AUTO: 0.66 K/UL
MONOCYTES NFR BLD AUTO: 7.9 %
NEUTROPHILS # BLD AUTO: 6.62 K/UL
NEUTROPHILS NFR BLD AUTO: 79.5 %
PLATELET # BLD AUTO: 223 K/UL
RBC # BLD: 3.27 M/UL
RBC # FLD: 15.2 %
TTG IGA SER IA-ACNC: <0.5 U/ML
TTG IGA SER-ACNC: NEGATIVE
TTG IGG SER IA-ACNC: <0.8 U/ML
TTG IGG SER IA-ACNC: NEGATIVE
VIT B12 SERPL-MCNC: 322 PG/ML
WBC # FLD AUTO: 8.33 K/UL

## 2025-02-05 ENCOUNTER — TRANSCRIPTION ENCOUNTER (OUTPATIENT)
Age: 85
End: 2025-02-05

## 2025-02-07 ENCOUNTER — TRANSCRIPTION ENCOUNTER (OUTPATIENT)
Age: 85
End: 2025-02-07

## 2025-02-10 ENCOUNTER — APPOINTMENT (OUTPATIENT)
Dept: RHEUMATOLOGY | Facility: CLINIC | Age: 85
End: 2025-02-10

## 2025-02-10 ENCOUNTER — APPOINTMENT (OUTPATIENT)
Dept: CARE COORDINATION | Facility: HOME HEALTH | Age: 85
End: 2025-02-10
Payer: MEDICARE

## 2025-02-10 VITALS
SYSTOLIC BLOOD PRESSURE: 139 MMHG | DIASTOLIC BLOOD PRESSURE: 90 MMHG | RESPIRATION RATE: 18 BRPM | TEMPERATURE: 97.4 F | OXYGEN SATURATION: 97 % | HEART RATE: 68 BPM

## 2025-02-10 DIAGNOSIS — N18.31 CHRONIC KIDNEY DISEASE, STAGE 3A: ICD-10-CM

## 2025-02-10 DIAGNOSIS — I48.0 PAROXYSMAL ATRIAL FIBRILLATION: ICD-10-CM

## 2025-02-10 DIAGNOSIS — J18.9 PNEUMONIA, UNSPECIFIED ORGANISM: ICD-10-CM

## 2025-02-10 DIAGNOSIS — K21.9 GASTRO-ESOPHAGEAL REFLUX DISEASE W/OUT ESOPHAGITIS: ICD-10-CM

## 2025-02-10 DIAGNOSIS — I10 ESSENTIAL (PRIMARY) HYPERTENSION: ICD-10-CM

## 2025-02-10 PROCEDURE — 99349 HOME/RES VST EST MOD MDM 40: CPT

## 2025-02-10 RX ORDER — AMOXICILLIN AND CLAVULANATE POTASSIUM 875; 125 MG/1; MG/1
875-125 TABLET, COATED ORAL TWICE DAILY
Qty: 6 | Refills: 0 | Status: ACTIVE | COMMUNITY
Start: 2025-02-10 | End: 1900-01-01

## 2025-02-10 RX ORDER — FLUTICASONE FUROATE AND VILANTEROL TRIFENATATE 100; 25 UG/1; UG/1
100-25 POWDER RESPIRATORY (INHALATION)
Refills: 0 | Status: ACTIVE | COMMUNITY

## 2025-02-10 RX ORDER — DIGOXIN 125 UG/1
125 TABLET ORAL
Refills: 0 | Status: ACTIVE | COMMUNITY

## 2025-02-10 RX ORDER — DILTIAZEM HCL 360 MG
360 CAPSULE, EXT RELEASE 24 HR ORAL
Refills: 0 | Status: ACTIVE | COMMUNITY

## 2025-02-11 ENCOUNTER — RESULT REVIEW (OUTPATIENT)
Age: 85
End: 2025-02-11

## 2025-02-13 ENCOUNTER — NON-APPOINTMENT (OUTPATIENT)
Age: 85
End: 2025-02-13

## 2025-02-19 ENCOUNTER — TRANSCRIPTION ENCOUNTER (OUTPATIENT)
Age: 85
End: 2025-02-19

## 2025-03-03 ENCOUNTER — TRANSCRIPTION ENCOUNTER (OUTPATIENT)
Age: 85
End: 2025-03-03

## 2025-03-26 ENCOUNTER — OFFICE (OUTPATIENT)
Dept: URBAN - METROPOLITAN AREA CLINIC 29 | Facility: CLINIC | Age: 85
Setting detail: OPHTHALMOLOGY
End: 2025-03-26
Payer: MEDICARE

## 2025-03-26 DIAGNOSIS — H35.3111: ICD-10-CM

## 2025-03-26 DIAGNOSIS — H16.223: ICD-10-CM

## 2025-03-26 DIAGNOSIS — H44.23: ICD-10-CM

## 2025-03-26 DIAGNOSIS — H26.493: ICD-10-CM

## 2025-03-26 DIAGNOSIS — H35.3122: ICD-10-CM

## 2025-03-26 PROBLEM — H26.40 POSTERIOR CAPSULAR OPACIFICATION ; BOTH EYES: Status: ACTIVE | Noted: 2025-03-26

## 2025-03-26 PROBLEM — H53.2 DIPLOPIA: Status: ACTIVE | Noted: 2025-03-26

## 2025-03-26 PROCEDURE — 92014 COMPRE OPH EXAM EST PT 1/>: CPT | Performed by: OPHTHALMOLOGY

## 2025-03-26 PROCEDURE — 92250 FUNDUS PHOTOGRAPHY W/I&R: CPT | Performed by: OPHTHALMOLOGY

## 2025-03-26 ASSESSMENT — REFRACTION_CURRENTRX
OS_SPHERE: -1.25
OS_AXIS: 20
OS_OVR_VA: 20/
OD_AXIS: 174
OD_SPHERE: -1.50
OD_CYLINDER: +1.00
OD_OVR_VA: 20/
OS_CYLINDER: +0.75

## 2025-03-26 ASSESSMENT — TEAR BREAK UP TIME (TBUT)
OS_TBUT: 2+
OD_TBUT: 2+

## 2025-03-26 ASSESSMENT — REFRACTION_AUTOREFRACTION
OD_SPHERE: -2.00
OS_CYLINDER: +2.00
OD_CYLINDER: +2.50
OS_AXIS: 021
OS_SPHERE: -1.75
OD_AXIS: 180

## 2025-03-26 ASSESSMENT — REFRACTION_MANIFEST
OS_SPHERE: -1.00
OS_CYLINDER: SPHERE
OU_VA: 20/25
OS_VA1: 20/40
OD_CYLINDER: -2.00
OD_SPHERE: -0.25
OD_AXIS: 85
OD_VA1: 20/25

## 2025-03-26 ASSESSMENT — VISUAL ACUITY
OS_BCVA: 20/30+2
OD_BCVA: 20/30-2

## 2025-03-26 ASSESSMENT — CONFRONTATIONAL VISUAL FIELD TEST (CVF)
OD_FINDINGS: FULL
OS_FINDINGS: FULL

## 2025-03-26 ASSESSMENT — SUPERFICIAL PUNCTATE KERATITIS (SPK)
OD_SPK: 1+
OS_SPK: 1+

## 2025-06-14 ENCOUNTER — RX RENEWAL (OUTPATIENT)
Age: 85
End: 2025-06-14

## 2025-07-01 ENCOUNTER — TRANSCRIPTION ENCOUNTER (OUTPATIENT)
Age: 85
End: 2025-07-01

## 2025-08-22 ENCOUNTER — NON-APPOINTMENT (OUTPATIENT)
Age: 85
End: 2025-08-22